# Patient Record
Sex: FEMALE | Race: WHITE | NOT HISPANIC OR LATINO | Employment: UNEMPLOYED | URBAN - METROPOLITAN AREA
[De-identification: names, ages, dates, MRNs, and addresses within clinical notes are randomized per-mention and may not be internally consistent; named-entity substitution may affect disease eponyms.]

---

## 2017-11-01 ENCOUNTER — HOSPITAL ENCOUNTER (EMERGENCY)
Facility: HOSPITAL | Age: 37
Discharge: HOME | End: 2017-11-01
Attending: FAMILY MEDICINE
Payer: MEDICAID

## 2017-11-01 VITALS
OXYGEN SATURATION: 98 % | BODY MASS INDEX: 31.18 KG/M2 | RESPIRATION RATE: 20 BRPM | DIASTOLIC BLOOD PRESSURE: 82 MMHG | WEIGHT: 176 LBS | SYSTOLIC BLOOD PRESSURE: 126 MMHG | TEMPERATURE: 99 F | HEIGHT: 63 IN | HEART RATE: 66 BPM

## 2017-11-01 DIAGNOSIS — S05.01XA ABRASION OF RIGHT CORNEA, INITIAL ENCOUNTER: Primary | ICD-10-CM

## 2017-11-01 PROCEDURE — 99283 EMERGENCY DEPT VISIT LOW MDM: CPT

## 2017-11-01 PROCEDURE — 25000003 PHARM REV CODE 250: Performed by: FAMILY MEDICINE

## 2017-11-01 RX ORDER — PROPARACAINE HYDROCHLORIDE 5 MG/ML
2 SOLUTION/ DROPS OPHTHALMIC
Status: COMPLETED | OUTPATIENT
Start: 2017-11-01 | End: 2017-11-01

## 2017-11-01 RX ORDER — TETRACAINE HYDROCHLORIDE 5 MG/ML
2 SOLUTION OPHTHALMIC
Status: DISCONTINUED | OUTPATIENT
Start: 2017-11-01 | End: 2017-11-01

## 2017-11-01 RX ORDER — TOBRAMYCIN AND DEXAMETHASONE 3; 1 MG/ML; MG/ML
1 SUSPENSION/ DROPS OPHTHALMIC EVERY 6 HOURS
Qty: 5 ML | Refills: 0 | Status: SHIPPED | OUTPATIENT
Start: 2017-11-01

## 2017-11-01 RX ADMIN — PROPARACAINE HYDROCHLORIDE 2 DROP: 5 SOLUTION/ DROPS OPHTHALMIC at 01:11

## 2017-11-01 NOTE — ED PROVIDER NOTES
Encounter Date: 2017       History     Chief Complaint   Patient presents with    Eye Pain     right eye pain after poking her eye while cleaning the refrigerator      37 yo female poked herself in the eye with a refrigerator drawer.       The history is provided by the patient.   Eye Pain    This is a new problem. The current episode started just prior to arrival. The problem occurs constantly. The problem has been unchanged. The right eye is affected. The injury mechanism was a direct trauma. Associated symptoms include blurred vision, decreased vision, photophobia and eye redness. Pertinent negatives include no numbness, no nausea and no weakness.     Review of patient's allergies indicates:  No Known Allergies  Past Medical History:   Diagnosis Date    Hypertension      delivery     Seizures     grand mal last episode - no medication, no neurologist     History reviewed. No pertinent surgical history.  Family History   Problem Relation Age of Onset    Diabetes Maternal Grandmother     Miscarriages / Stillbirths Maternal Grandfather     Lung cancer Father     Miscarriages / Stillbirths Mother     Breast cancer Neg Hx     Colon cancer Neg Hx      Social History   Substance Use Topics    Smoking status: Current Every Day Smoker     Packs/day: 0.50     Types: Cigarettes    Smokeless tobacco: Never Used    Alcohol use No      Comment: very rare     Review of Systems   Constitutional: Negative for fever.   HENT: Negative for sore throat.    Eyes: Positive for blurred vision, photophobia, pain and redness.   Respiratory: Negative for shortness of breath.    Cardiovascular: Negative for chest pain.   Gastrointestinal: Negative for nausea.   Genitourinary: Negative for dysuria.   Musculoskeletal: Negative for back pain.   Skin: Negative for rash.   Neurological: Negative for weakness and numbness.   Hematological: Does not bruise/bleed easily.   All other systems reviewed and are  negative.      Physical Exam     Initial Vitals [11/01/17 1207]   BP Pulse Resp Temp SpO2   (!) 147/100 67 20 98.2 °F (36.8 °C) 98 %      MAP       115.67         Physical Exam    Nursing note and vitals reviewed.  Constitutional: She appears well-developed.   HENT:   Head: Normocephalic and atraumatic.   Right Ear: External ear normal.   Left Ear: External ear normal.   Nose: Nose normal.   Mouth/Throat: Oropharynx is clear and moist.   Eyes: EOM are normal. Pupils are equal, round, and reactive to light. Right eye exhibits no discharge. Left eye exhibits no discharge.       Abrasion seen with fluroscene and woods lamp.  No foreign body or other injury   Neck: Normal range of motion. Neck supple. No tracheal deviation present.   Cardiovascular: Normal rate, regular rhythm and normal heart sounds.   No murmur heard.  Pulmonary/Chest: Breath sounds normal. No respiratory distress. She has no wheezes.   Abdominal: Soft. Bowel sounds are normal.   Neurological: She is alert and oriented to person, place, and time.   Skin: Skin is warm and dry.         ED Course   Procedures  Labs Reviewed - No data to display          Medical Decision Making:   Initial Assessment:   Patient in moderate distress with pain at site and no other complains    Differential Diagnosis:   Conjunctivitis  Foreign body  Irritation  Abrasion corneal                     ED Course      Clinical Impression:   The encounter diagnosis was Abrasion of right cornea, initial encounter.                           Sabino Louise MD  11/01/17 3676

## 2023-01-04 DIAGNOSIS — Z12.31 SCREENING MAMMOGRAM, ENCOUNTER FOR: Primary | ICD-10-CM

## 2024-06-24 ENCOUNTER — HOSPITAL ENCOUNTER (EMERGENCY)
Facility: HOSPITAL | Age: 44
Discharge: HOME | End: 2024-06-24
Attending: EMERGENCY MEDICINE

## 2024-06-24 VITALS
OXYGEN SATURATION: 99 % | HEART RATE: 89 BPM | BODY MASS INDEX: 25.61 KG/M2 | HEIGHT: 64 IN | DIASTOLIC BLOOD PRESSURE: 93 MMHG | WEIGHT: 150 LBS | RESPIRATION RATE: 18 BRPM | TEMPERATURE: 98 F | SYSTOLIC BLOOD PRESSURE: 157 MMHG

## 2024-06-24 DIAGNOSIS — S80.12XA CONTUSION OF LEFT LOWER EXTREMITY, INITIAL ENCOUNTER: ICD-10-CM

## 2024-06-24 DIAGNOSIS — T14.90XA TRAUMA: Primary | ICD-10-CM

## 2024-06-24 LAB
B-HCG UR QL: NEGATIVE
CTP QC/QA: YES

## 2024-06-24 PROCEDURE — 99284 EMERGENCY DEPT VISIT MOD MDM: CPT | Mod: 25

## 2024-06-24 PROCEDURE — 81025 URINE PREGNANCY TEST: CPT | Performed by: NURSE PRACTITIONER

## 2024-06-24 RX ORDER — IBUPROFEN 800 MG/1
800 TABLET ORAL EVERY 6 HOURS PRN
Qty: 20 TABLET | Refills: 0 | Status: SHIPPED | OUTPATIENT
Start: 2024-06-24

## 2024-06-24 NOTE — Clinical Note
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  She may return to work on 06/26/2024.       If you have any questions or concerns, please don't hesitate to call.      Aarti Davenport NP"

## 2024-06-24 NOTE — ED TRIAGE NOTES
Pt presents to ED today reports 40 lbs object falling on LLE on 6/3/2024   Pt reports she was seen at [facility] ED and advised to rest, elevate, and wrap area  Pt denies relief in sx and reports redness to area

## 2024-06-24 NOTE — ED PROVIDER NOTES
Encounter Date: 2024       History     Chief Complaint   Patient presents with    Ankle Injury     Left ankle injury from  from object falling on ankle. Pain has not resolved after RICE. 7/10 pain progressively worsening in the last week radiating up left leg.      43-year-old female presents emergency room with reports of left lower leg pain.  Patient states she had a cutting board that was approximately 45 lb fall on the lower leg in which she had x-rays that were negative for any acute fractures.  Patient states she has since began having worsening of pain in which the initial injury occurred over 2 weeks ago.  Patient reports that she is noticed some increase in swelling also to the lower leg.    The history is provided by the patient. No  was used.     Review of patient's allergies indicates:  No Known Allergies  Past Medical History:   Diagnosis Date    Hypertension      delivery     Seizures     grand mal last episode - no medication, no neurologist     No past surgical history on file.  Family History   Problem Relation Name Age of Onset    Diabetes Maternal Grandmother      Miscarriages / Stillbirths Maternal Grandfather      Lung cancer Father      Miscarriages / Stillbirths Mother      Breast cancer Neg Hx      Colon cancer Neg Hx       Social History     Tobacco Use    Smoking status: Every Day     Current packs/day: 0.50     Types: Cigarettes    Smokeless tobacco: Never   Substance Use Topics    Alcohol use: No     Comment: very rare    Drug use: No     Review of Systems   All other systems reviewed and are negative.      Physical Exam     Initial Vitals [24 0934]   BP Pulse Resp Temp SpO2   (!) 157/93 89 18 98.1 °F (36.7 °C) 99 %      MAP       --         Physical Exam    Constitutional: She appears well-developed and well-nourished.   HENT:   Head: Normocephalic.   Right Ear: Hearing and tympanic membrane normal.   Left Ear: Hearing and tympanic  membrane normal.   Nose: Nose normal.   Mouth/Throat: Oropharynx is clear and moist.   Eyes: Lids are normal. Pupils are equal, round, and reactive to light.   Neck:   Normal range of motion.  Cardiovascular:  Normal rate.           Pulmonary/Chest: Breath sounds normal. No respiratory distress. She has no wheezes. She has no rhonchi.   Abdominal: Abdomen is soft. There is no abdominal tenderness.   Musculoskeletal:         General: Normal range of motion.      Cervical back: Normal range of motion. No rigidity.        Legs:       Comments: There is a contusion noted to the marked location with swelling present.  There is no break in skin noted.  There is no red streaking.  Positive distal pulses present.     Neurological: She is alert and oriented to person, place, and time.   Skin: Skin is warm and dry. No rash noted.   Psychiatric: She has a normal mood and affect. Her behavior is normal. Judgment and thought content normal.         ED Course   Procedures  Labs Reviewed   POCT URINE PREGNANCY          Imaging Results              CT Leg (Tibia-Fibula) Without Contrast Left (Final result)  Result time 06/24/24 10:51:50      Final result by Stephen Wilkinson MD (06/24/24 10:51:50)                   Impression:      No evidence for acute tibial or fibular fracture.    Focal ill-defined fluid with stranding at the medial distal lower leg just above the ankle.  To correlate with physical exam and history.      Electronically signed by: Stephen Wilkinson  Date:    06/24/2024  Time:    10:51               Narrative:    EXAMINATION:  CT LEG (TIBIA-FIBULA) WITHOUT CONTRAST LEFT    CLINICAL HISTORY:  Lower leg pain, stress fracture suspected, neg xray;    TECHNIQUE:  Thin axial images were obtained through the left tib-fibula without intravenous contrast.  Coronal and sagittal images were reviewed.    COMPARISON:  Tib-fib radiograph dated 06/13/2024.    FINDINGS:  Visualized osseous structures appear intact without  evidence for acute fracture or bony destruction.  A few small sclerotic foci at the lateral tibial plateau and medial femoral condyle, possible bone islands.  Muscle bulk appears maintained.  Medial subcutaneous stranding with focal ill-defined fluid at the distal lower leg just above the ankle.                                       Medications - No data to display  Medical Decision Making  Differential Diagnosis includes, but is not limited to:  Fracture, dislocation, compartment syndrome, nerve injury/palsy, vascular injury, DVT, rhabdomyolysis, hemarthrosis, septic joint, cellulitis, bursitis, muscle strain, ligament tear/sprain, laceration, foreign body, abrasion, soft tissue contusion, osteoarthritis.       Amount and/or Complexity of Data Reviewed  Labs: ordered. Decision-making details documented in ED Course.  Radiology: ordered.    Risk  Prescription drug management.               ED Course as of 06/24/24 1954 Mon Jun 24, 2024   1136 FINDINGS:  Visualized osseous structures appear intact without evidence for acute fracture or bony destruction.  A few small sclerotic foci at the lateral tibial plateau and medial femoral condyle, possible bone islands.  Muscle bulk appears maintained.  Medial subcutaneous stranding with focal ill-defined fluid at the distal lower leg just above the ankle.     Impression:     No evidence for acute tibial or fibular fracture.     Focal ill-defined fluid with stranding at the medial distal lower leg just above the ankle.  To correlate with physical exam and history.      [DT]   1136 POCT urine pregnancy [DT]   1140 Ct reviewed with no fracture noted however small fluid collection noted. Case reviewed with Dr Washington. Pt can follow up with ortho from outpatient standpoint. Referral was placed here today. Crutches given with ace wrap applied. STRICT return precautions given. Pt did not want any meds for pain during her visit here in the ER.  [DT]      ED Course User Index  [DT]  Aarti Davenport NP                           Clinical Impression:  Final diagnoses:  [T14.90XA] Trauma (Primary)  [S80.12XA] Contusion of left lower extremity, initial encounter          ED Disposition Condition    Discharge Stable          ED Prescriptions       Medication Sig Dispense Start Date End Date Auth. Provider    ibuprofen (ADVIL,MOTRIN) 800 MG tablet Take 1 tablet (800 mg total) by mouth every 6 (six) hours as needed for Pain. 20 tablet 6/24/2024 -- Aarti Davenport NP          Follow-up Information       Follow up With Specialties Details Why Contact Info    Erick Colby MD Orthopedic Surgery Schedule an appointment as soon as possible for a visit in 2 days  [address] [facility]  [address]  [facility] [address]  [phone]               Aarti Davenport NP  06/24/24 1954

## 2024-06-24 NOTE — DISCHARGE INSTRUCTIONS

## 2024-07-02 ENCOUNTER — TELEPHONE (OUTPATIENT)
Dept: PODIATRY | Facility: CLINIC | Age: 44
End: 2024-07-02
Payer: COMMERCIAL

## 2024-07-02 NOTE — TELEPHONE ENCOUNTER
----- Message from Indigo Rosario RN sent at 6/28/2024 12:17 PM CDT -----  Juliana Luna MA P Treuting Robert J Staff; ADOLFO Morrison Staff; Tashi Wiley, [place]        In reference to the below email:     Can you please ask the Ortho department to contact me directly as her  to get the appointment scheduled?   I will need to be present at the appointment and the patient is currently not working so there is no barrier in her schedule.           Guerline Sheehan RN, BSN  Medical Case Manager  On behalf of [facility]  Cell: [phone]  Fax: [phone]  [email or web address]         Good afternoon:       Please accept this email as authorization for an evaluation and treatment appointment for an orthopedist on behalf of the [facility].   We are requesting an appointment as soon as possible with an orthopedist for a Left Ankle Sprain sustained on 6/3/24.       She [facility] ED for this [facility]     Can someone from the Orthopedic department call Ms. Llanos to get her scheduled as soon as possible?        Please contact me if you need any other information.        billing:  MEG Ramirez    [facility] [employer]  Claims Department  [address] [place]  [address]  Toll Free Number [phone] ext [phone]  Direct Dial:  [phone]  Fax Number:  [phone]   Referral  Referral # [id number]         Referral Information    Referral # Creation Date Referral Status Status Update   [id number] 06/24/2024 Authorized 06/28/2024: Status History       Status Reason Referral Type Referral Reasons Referral Class  Received Carrier Authorization Consultation none Internal       To Specialty To Provider To Location/Place of Service To Department  Orthopedic Surgery none none none       To Vendor Referred By By Location/Place of Service By Department  none Aarti Davenport  G., NP \[facility]\"" EMERGENCY DEPARTMENT       Priority Start Date Expiration Date Referral Entered By  Urgent 06/24/2024 06/24/2025 Aarti Davenport NP       Visits Requested Visits Authorized Visits Completed Visits Scheduled  1 1          Procedure Information    Service Details  Procedure Modifiers Provider Requested Approved  [id number] - AMB REFERRAL/CONSULT TO ORTHOPEDICS none  1 1      Diagnosis Information    Diagnosis  T14.90XA (ICD-10-CM) - Trauma  S80.12XA (ICD-10-CM) - Contusion of left lower extremity, initial encounter      Referral Notes  Number of Notes: 1  .  Type Date User Summary Attachment  General 06/28/2024 11:47 AM Juliana Luna MA approval received -  Note:  Good afternoon:        Please accept this email as authorization for an evaluation and treatment appointment for an orthopedist on behalf of the [employer].   We are requesting an appointment as soon as possible with an orthopedist for a Left Ankle Sprain sustained on 6/3/24.       She presented to the ED for this injury on 6/3/24 [facility] and on 6/24/24 [facility].       Can someone from the Orthopedic department call Ms. Llanos to get her scheduled as soon as possible?          Please contact me if you need any other information.       WC billing:  MEG Ramirez     [employer]  Claims Department   [address]  [address]  Toll Free Number [phone]  Direct Dial:  [phone]  Fax Number:  [phone]

## 2024-07-02 NOTE — TELEPHONE ENCOUNTER
Spoke with Guerline Sheehan RN, BSN, Medical Case Manager [facility]  Cell: [phone]  Fax: [phone]  [email or web address]    To assist her with making an appt with Dr Franklin for an ankle sprain. Accepted appt on behalf of the pt for 7/9/24 at 4:30 PM. No other needs voiced at this time. Per Guerline pt will need an updated work status letter with her visit. No other needs voiced at this time. Call back encouraged if needed.

## 2024-07-09 ENCOUNTER — OFFICE VISIT (OUTPATIENT)
Dept: PODIATRY | Facility: CLINIC | Age: 44
End: 2024-07-09
Payer: COMMERCIAL

## 2024-07-09 VITALS
SYSTOLIC BLOOD PRESSURE: 131 MMHG | HEART RATE: 76 BPM | DIASTOLIC BLOOD PRESSURE: 74 MMHG | BODY MASS INDEX: 25.6 KG/M2 | WEIGHT: 149.94 LBS | HEIGHT: 64 IN

## 2024-07-09 DIAGNOSIS — M76.822 POSTERIOR TIBIAL TENDONITIS, LEFT: ICD-10-CM

## 2024-07-09 DIAGNOSIS — S90.02XS: Primary | ICD-10-CM

## 2024-07-09 DIAGNOSIS — T14.90XA TRAUMA: ICD-10-CM

## 2024-07-09 DIAGNOSIS — S99.912S INJURY OF LEFT ANKLE, SEQUELA: ICD-10-CM

## 2024-07-09 PROCEDURE — 99203 OFFICE O/P NEW LOW 30 MIN: CPT | Mod: S$GLB,,, | Performed by: PODIATRIST

## 2024-07-09 PROCEDURE — 99999 PR PBB SHADOW E&M-EST. PATIENT-LVL IV: CPT | Mod: PBBFAC,,, | Performed by: PODIATRIST

## 2024-07-10 NOTE — PROGRESS NOTES
"Subjective:     Patient ID: Bel Dunbar is a 43 y.o. female.    Chief Complaint: Ankle Pain (left)    Presents as a follow-up from urgent care for initial injury that occurred to the left ankle region on Deanna 3, 2024 after having a 40 lb cutting board land on her ankle region.  States the pain has significantly improved overall since the injury.  Initially treated at the ED on 2024 and diagnosed with a hematoma of the left distal leg.  X-ray of the left tib-fib and left foot were negative for fracture or dislocation.  Subsequent visit at the ED on 2024 patient received a CT which had shown a nonspecific fluid collection at the distal medial aspect of the left leg superior to the ankle joint line.  Patient has a history smoking pack and a half per day.  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  She has been out of work since the initial injury.  Also relates to a new pending work position.    Vitals:    24 1622   BP: 131/74   Pulse: 76   Weight: 68 kg (149 lb 14.6 oz)   Height: 5' 4" (1.626 m)   PainSc:   3      Past Medical History:   Diagnosis Date    Hypertension      delivery     Seizures     grand mal last episode - no medication, no neurologist       No past surgical history on file.    Family History   Problem Relation Name Age of Onset    Diabetes Maternal Grandmother      Miscarriages / Stillbirths Maternal Grandfather      Lung cancer Father      Miscarriages / Stillbirths Mother      Breast cancer Neg Hx      Colon cancer Neg Hx         Social History     Socioeconomic History    Marital status: Single   Tobacco Use    Smoking status: Every Day     Current packs/day: 0.50     Types: Cigarettes    Smokeless tobacco: Never   Substance and Sexual Activity    Alcohol use: No     Comment: very rare    Drug use: No    Sexual activity: Yes     Partners: Male     Birth control/protection: None       Current Outpatient Medications "   Medication Sig Dispense Refill    alprazolam (XANAX) 0.5 MG tablet       butalbital-aspirin-caffeine -40 mg (FIORINAL) -40 mg Cap       hydrocodone-acetaminophen 5-325mg (NORCO) 5-325 mg per tablet Take 1 tablet by mouth every 4 (four) hours as needed for Pain. 30 tablet 0    ibuprofen (ADVIL,MOTRIN) 800 MG tablet Take 1 tablet (800 mg total) by mouth every 6 (six) hours as needed for Pain. 20 tablet 0    NIFEDICAL XL 30 mg 24 hr tablet       sertraline (ZOLOFT) 25 MG tablet       tobramycin-dexamethasone 0.3-0.1% (TOBRADEX) 0.3-0.1 % DrpS Place 1 drop into the right eye every 6 (six) hours. 5 mL 0     No current facility-administered medications for this visit.       Review of patient's allergies indicates:  No Known Allergies    Review of Systems   Constitutional: Negative for chills and fever.   HENT:  Negative for congestion and hearing loss.    Skin:  Positive for color change. Negative for poor wound healing.   Musculoskeletal:  Positive for joint pain.   Gastrointestinal:  Negative for nausea and vomiting.   Neurological:  Negative for numbness and paresthesias.   Psychiatric/Behavioral:  Negative for altered mental status.         Objective:     Physical Exam  Vitals reviewed.   Constitutional:       Appearance: Normal appearance.   Musculoskeletal:      Comments: Palpable area questionable fluctuance and localized edema with resolving ecchymosis at the distal medial aspect of the right leg proximal to the medial malleolus with localized pain on palpation.  There is some mild pain on palpation overlying the posterior tibial tendon commencing inferior to the medial malleolus and extending towards the navicular with mild pain during active resisted inversion of the left foot.  Negative anterior drawer sign left ankle.  No pain stress inversion or eversion of the left ankle.  No pain with active range of motion to the left foot and ankle.    Rectus appearing foot type bilateral.  Range  motion to the ankle and foot are within normal limits and supple.   Skin:     General: Skin is warm.      Capillary Refill: Capillary refill takes less than 2 seconds.      Findings: Ecchymosis present. No erythema.      Nails: There is no clubbing.      Comments: Resolving ecchymosis at the distal medial aspect of the left leg in the area of the palpable hematoma.   Neurological:      Mental Status: She is alert.           Assessment:      Encounter Diagnoses   Name Primary?    Trauma     Traumatic hematoma of left ankle, sequela Yes    Injury of left ankle, sequela     Posterior tibial tendonitis, left      Plan:     Bel was seen today for ankle pain.    Diagnoses and all orders for this visit:    Traumatic hematoma of left ankle, sequela    Trauma  -     Ambulatory referral/consult to Orthopedics    Injury of left ankle, sequela    Posterior tibial tendonitis, left      I counseled the patient on her conditions, their implications and medical management.    Reviewed all previous imaging in detail with the patient.  Clinical exam matches diagnosis of the traumatic hematoma which is slowly improving.  We discussed continued conservative care such as rest, elevation and applying warm compresses as needed.  Patient may slowly increase activity as tolerated.  We also discussed possibility of an I and D to evacuate the hematoma however patient declined.  Risk is increased with smoking history.  Patient declined surgical intervention regardless.  A letter was dispensed on the patient's behalf limiting her activity to stand and walking less than 30 minutes/hour and performing light duty.  She is limited to 4 hours per day up to 5 days per week.  Patient is return to clinic within 4 weeks to re-evaluate and anticipate full return to duty without restrictions at that time.      RTC p.r.n. as discussed.    Assisted by Setphen Muniz DPM PGY 2    A portion of this note was generated by voice recognition software and  may contain spelling and grammar errors.      .

## 2024-08-07 ENCOUNTER — PATIENT MESSAGE (OUTPATIENT)
Dept: PODIATRY | Facility: CLINIC | Age: 44
End: 2024-08-07
Payer: COMMERCIAL

## 2024-08-07 ENCOUNTER — TELEPHONE (OUTPATIENT)
Dept: PODIATRY | Facility: CLINIC | Age: 44
End: 2024-08-07
Payer: COMMERCIAL

## 2024-08-08 ENCOUNTER — OFFICE VISIT (OUTPATIENT)
Dept: PODIATRY | Facility: CLINIC | Age: 44
End: 2024-08-08
Payer: COMMERCIAL

## 2024-08-08 VITALS
SYSTOLIC BLOOD PRESSURE: 111 MMHG | HEIGHT: 64 IN | BODY MASS INDEX: 25.6 KG/M2 | WEIGHT: 149.94 LBS | HEART RATE: 68 BPM | DIASTOLIC BLOOD PRESSURE: 72 MMHG

## 2024-08-08 DIAGNOSIS — S90.02XS: Primary | ICD-10-CM

## 2024-08-08 DIAGNOSIS — S99.912S INJURY OF LEFT ANKLE, SEQUELA: ICD-10-CM

## 2024-08-08 PROCEDURE — 99999 PR PBB SHADOW E&M-EST. PATIENT-LVL III: CPT | Mod: PBBFAC,,, | Performed by: PODIATRIST
